# Patient Record
Sex: FEMALE | Race: WHITE | NOT HISPANIC OR LATINO | Employment: FULL TIME | URBAN - METROPOLITAN AREA
[De-identification: names, ages, dates, MRNs, and addresses within clinical notes are randomized per-mention and may not be internally consistent; named-entity substitution may affect disease eponyms.]

---

## 2017-08-05 ENCOUNTER — TRANSFERRED RECORDS (OUTPATIENT)
Dept: HEALTH INFORMATION MANAGEMENT | Facility: CLINIC | Age: 32
End: 2017-08-05

## 2017-08-06 ENCOUNTER — TRANSFERRED RECORDS (OUTPATIENT)
Dept: HEALTH INFORMATION MANAGEMENT | Facility: CLINIC | Age: 32
End: 2017-08-06

## 2017-08-07 ENCOUNTER — TRANSFERRED RECORDS (OUTPATIENT)
Dept: HEALTH INFORMATION MANAGEMENT | Facility: CLINIC | Age: 32
End: 2017-08-07

## 2017-08-09 ENCOUNTER — TRANSFERRED RECORDS (OUTPATIENT)
Dept: HEALTH INFORMATION MANAGEMENT | Facility: CLINIC | Age: 32
End: 2017-08-09

## 2017-08-11 ENCOUNTER — TRANSFERRED RECORDS (OUTPATIENT)
Dept: HEALTH INFORMATION MANAGEMENT | Facility: CLINIC | Age: 32
End: 2017-08-11

## 2017-08-29 ENCOUNTER — TRANSFERRED RECORDS (OUTPATIENT)
Dept: HEALTH INFORMATION MANAGEMENT | Facility: CLINIC | Age: 32
End: 2017-08-29

## 2017-08-30 DIAGNOSIS — H53.10 SUBJECTIVE VISUAL DISTURBANCE: Primary | ICD-10-CM

## 2017-08-31 ENCOUNTER — OFFICE VISIT (OUTPATIENT)
Dept: OPHTHALMOLOGY | Facility: CLINIC | Age: 32
End: 2017-08-31
Attending: OPHTHALMOLOGY
Payer: COMMERCIAL

## 2017-08-31 DIAGNOSIS — H53.10 SUBJECTIVE VISUAL DISTURBANCE: ICD-10-CM

## 2017-08-31 DIAGNOSIS — G93.2 IIH (IDIOPATHIC INTRACRANIAL HYPERTENSION): Primary | ICD-10-CM

## 2017-08-31 PROCEDURE — 92133 CPTRZD OPH DX IMG PST SGM ON: CPT | Mod: ZF | Performed by: OPHTHALMOLOGY

## 2017-08-31 PROCEDURE — 99214 OFFICE O/P EST MOD 30 MIN: CPT | Mod: 25,ZF

## 2017-08-31 PROCEDURE — 92083 EXTENDED VISUAL FIELD XM: CPT | Mod: ZF | Performed by: OPHTHALMOLOGY

## 2017-08-31 RX ORDER — NICOTINE 21 MG/24HR
1 PATCH, TRANSDERMAL 24 HOURS TRANSDERMAL DAILY
COMMUNITY
Start: 2017-08-11 | End: 2018-07-24

## 2017-08-31 RX ORDER — NAPROXEN 500 MG/1
500 TABLET ORAL PRN
COMMUNITY
Start: 2017-08-07 | End: 2018-07-24

## 2017-08-31 RX ORDER — ACETAMINOPHEN 500 MG
1 TABLET ORAL PRN
COMMUNITY
End: 2018-11-27

## 2017-08-31 RX ORDER — HYDROCHLOROTHIAZIDE 25 MG/1
12.5 TABLET ORAL DAILY
COMMUNITY
Start: 2017-08-11 | End: 2018-07-24

## 2017-08-31 ASSESSMENT — REFRACTION_WEARINGRX
OD_SPHERE: -1.50
OS_CYLINDER: +0.50
OS_AXIS: 165
OD_AXIS: 178
OD_CYLINDER: +0.50
OS_SPHERE: -1.50

## 2017-08-31 ASSESSMENT — VISUAL ACUITY
OS_CC+: -1
OD_CC: J1+
OS_CC: 20/20
METHOD: SNELLEN - LINEAR
OD_CC: 20/20
CORRECTION_TYPE: GLASSES
OS_CC: J1+

## 2017-08-31 ASSESSMENT — EXTERNAL EXAM - RIGHT EYE: OD_EXAM: NORMAL

## 2017-08-31 ASSESSMENT — CUP TO DISC RATIO
OD_RATIO: 0.2
OS_RATIO: 0.2

## 2017-08-31 ASSESSMENT — TONOMETRY
OS_IOP_MMHG: 12
OD_IOP_MMHG: 12
IOP_METHOD: ICARE

## 2017-08-31 ASSESSMENT — EXTERNAL EXAM - LEFT EYE: OS_EXAM: NORMAL

## 2017-08-31 ASSESSMENT — SLIT LAMP EXAM - LIDS
COMMENTS: NORMAL
COMMENTS: NORMAL

## 2017-08-31 ASSESSMENT — CONF VISUAL FIELD
OS_NORMAL: 1
OD_NORMAL: 1

## 2017-08-31 NOTE — NURSING NOTE
Chief Complaints and History of Present Illnesses   Patient presents with     New Patient     vision changes     HPI    Affected eye(s):  Both   Symptoms:     Blurred vision   No floaters   No flashes   No Dryness   No photophobia         Do you have eye pain now?:  No      Comments:  New patient is here for vision changes.    The patient noticed vision changes and headaches two months ago.  ANDI Burgos 7:21 AM 08/31/2017

## 2017-08-31 NOTE — MR AVS SNAPSHOT
After Visit Summary   8/31/2017    Lana Kumar    MRN: [id number]           Patient Information     Date Of Birth          1985        Visit Information        Provider Department      8/31/2017 7:15 AM Eder Lea MD [facility]        Today's Diagnoses     IIH (idiopathic intracranial hypertension)    -  1    Subjective visual disturbance           Follow-ups after your visit        Follow-up notes from your care team     Return in about 5 weeks (around 10/5/2017) for Vision, color, tension, dilate, RNFL, GTOP.      Your next 10 appointments already scheduled     Oct 05, 2017 10:00 AM CDT   VISUAL FIELD with [facility] EYE VISUAL FIELD   Eye Clinic ([facility])    [address]  [address]   [facility] [address] [place] [id number] [phone]            Oct 05, 2017 10:30 AM CDT   RETURN NEURO with Eder Lea MD   Eye Clinic ([facility])    [address]  [address] [facility]  [facility] [address] [place] [id number] [phone]              Who to contact     Please call your clinic at [phone] to:    Ask questions about your health    Make or cancel appointments    Discuss your medicines    Learn about your test results    Speak to your doctor   If you have compliments or concerns about an experience at your clinic, or if you wish to file a complaint, please contact [facility] Patient Relations at [phone] or email us at [email or web address]         Additional Information About Your Visit        MyChart Information     whodoyou is an electronic gateway that provides easy, online access to your medical records. With whodoyou, you can request a clinic appointment, read your test results, renew a prescription or communicate with your care team.     To sign up for Video Passportst visit the website at [email or web address]   You will be asked to enter the access code listed below, as well as  some personal information. Please follow the directions to create your username and password.     Your access code is: [email or web address]  Expires: 2017  6:30 AM     Your access code will  in 90 days. If you need help or a new code, please contact your [facility] or call [phone] for assistance.        Care EveryWhere ID     This is your Care EveryWhere ID. This could be used by other organizations to access your [facility] medical records  [id number]         Blood Pressure from Last 3 Encounters:   No data found for BP    Weight from Last 3 Encounters:   No data found for Wt              We Performed the Following     Glaucoma Top OU     IOP Measurement     OCT Optic Nerve RNFL Spectralis OU (both eyes)        Primary Care Provider    None Specified       No primary provider on file.        Equal Access to Services     KRISTI LIMA : Immanuel Colby, waaxda luqadaha, qaybta kaalmada yadiel, jeremías merino . So [facility] [phone].    ATENCIÓN: Si habla español, tiene a james disposición servicios gratuitos de asistencia lingüística. Llame al [phone].    We comply with applicable federal civil rights laws and [place] laws. We do not discriminate on the basis of race, color, national origin, age, disability sex, sexual orientation or gender identity.            Thank you!     Thank you for choosing EYE CLINIC  for your care. Our goal is always to provide you with excellent care. Hearing back from our patients is one way we can continue to improve our services. Please take a few minutes to complete the written survey that you may receive in the mail after your visit with us. Thank you!             Your Updated Medication List - Protect others around you: Learn how to safely use, store and throw away your medicines at www.disposemymeds.org.          This list is accurate as of: 17  9:10 AM.  Always use your most recent med list.                    Brand Name Dispense Instructions for use Diagnosis    acetaminophen 500 MG tablet    TYLENOL     Take 1 tablet by mouth as needed        hydrochlorothiazide 25 MG tablet    HYDRODIURIL     Take 12.5 mg by mouth daily        naproxen 500 MG tablet    NAPROSYN     Take 500 mg by mouth as needed        nicotine 14 MG/24HR 24 hr patch    NICODERM CQ     1 patch daily        tiZANidine 4 MG tablet    ZANAFLEX     Take 4 mg by mouth daily

## 2017-08-31 NOTE — PROGRESS NOTES
Assessment & Plan            Assessment & Plan     Lana Kumar is a 32 year old female with the following diagnoses:   1. IIH (idiopathic intracranial hypertension)    2. Subjective visual disturbance       She is here referred by Dr. Desire Son O.D. for 2-month history of headache. Pressure-like starting in the forehead moving across the temples to the back. No transient visual obscurations, no pulsatile tinnitus or diplopia. She has been seen in ED in [facility] for severe headache on 8/5/17 where she had a spinal tap showing opening pressure of 36 cm H20 while laying flat and it was very painful to her. LP results were : Protein 26, Glucose 52, Cell count 4.   She also had MRI/MRV of the brain showing only stenosis at the junction between sigmoid and transverse sinus on both sides. She returned to the ED the next day for headache recurrence and she was given a blood patch. She has seen on 8/8 Dr. Reynolds at [facility] who was skeptical about the diagnosis of IIH and he thus repeated LP on 8/9 with Vallium prior and opening pressure was found to be 10.5. Dr. Reynolds believed that her headache is cervicogenic and recommended Tizanidine which she has been taking and seems to be helping and seeing a Chiropractor as well.    Her examination shows Frisen grade II disc edema OU. Visual fields were unreliable in the right eye and showed nasal step in the left eye.  However, the ganglion cell layer  Was normal both eyes.    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     This constellation of findings in the setting of normal CSF constituents is consistent with idiopathic intracranial hypertension. I would imagine that the lumbar puncture opening pressure the second time was falsely low.      Would recommend Diamox 500 mg BID and  follow up in 6 weeks sooner as needed for worsening symptoms.  I do not think her visual fields are accurate given the normal ganglion cell layer.  She states that she is very tired and did not do well on the testing.        Attending Physician Attestation:  Complete documentation of historical and exam elements from today's encounter can be found in the full encounter summary report (not reduplicated in this progress note).  I personally obtained the chief complaint(s) and history of present illness.  I confirmed and edited as necessary the review of systems, past medical/surgical history, family history, social history, and examination findings as documented by others; and I examined the patient myself.  I personally reviewed the relevant tests, images, and reports as documented above.  I formulated and edited as necessary the assessment and plan and discussed the findings and management plan with the patient and family. - Eder Lea MD

## 2017-08-31 NOTE — LETTER
2017         RE:  :  MRN: Lana Kumar  1985  [id number]     Dear Dr. Son,    Thank you for asking me to see your very pleasant patient, Lana Kumar, in neuro-ophthalmic consultation.  I would like to thank you for sending your records and I have summarized them in the history of present illness.   My assessment and plan are below.  For further details, please see my attached clinic note.      Assessment & Plan     Lana Kumar is a 32 year old female with the following diagnoses:   1. IIH (idiopathic intracranial hypertension)    2. Subjective visual disturbance       She is here referred by Dr. Desire Son O.D. for 2-month history of headache. Pressure-like starting in the forehead moving across the temples to the back. No transient visual obscurations, no pulsatile tinnitus or diplopia. She has been seen in ED in [facility] for severe headache on 17 where she had a spinal tap showing opening pressure of 36 cm H20 while laying flat and it was very painful to her. LP results were : Protein 26, Glucose 52, Cell count 4.   She also had MRI/MRV of the brain showing only stenosis at the junction between sigmoid and transverse sinus on both sides. She returned to the ED the next day for headache recurrence and she was given a blood patch. She has seen on  Dr. Reynolds at [facility] who was skeptical about the diagnosis of IIH and he thus repeated LP on  with Vallium prior and opening pressure was found to be 10.5. Dr. Reynolds believed that her headache is cervicogenic and recommended Tizanidine which she has been taking and seems to be helping and seeing a Chiropractor as well.    Her examination shows Frisen grade II disc edema OU. Visual fields were unreliable in the right eye and showed nasal step in the left eye.  However, the ganglion cell layer  Was normal both eyes.    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     This constellation of findings in the setting of normal CSF constituents is consistent with idiopathic intracranial hypertension. I would imagine that the lumbar puncture opening pressure the second time was falsely low.      Would recommend Diamox 500 mg BID and follow up in 6 weeks sooner as needed for worsening symptoms.  I do not think her visual fields are accurate given the normal ganglion cell layer.  She states that she is very tired and did not do well on the testing.       Again, thank you for allowing me to participate in the care of your patient.      Sincerely,    Eder Lea MD  Professor, Neuro-Ophthalmology  Department of Ophthalmology and Visual Neurosciences  [facility]    CC: Miriam Son OD  [address] [address]  VIA Facsimile: [phone]       DX = Idiopathic Intracranial Hypertension (IIH)

## 2017-10-04 DIAGNOSIS — H53.10 SUBJECTIVE VISUAL DISTURBANCE: Primary | ICD-10-CM

## 2018-07-24 ENCOUNTER — OFFICE VISIT (OUTPATIENT)
Dept: OPHTHALMOLOGY | Facility: CLINIC | Age: 33
End: 2018-07-24
Attending: OPHTHALMOLOGY
Payer: MEDICAID

## 2018-07-24 DIAGNOSIS — G93.2 IDIOPATHIC INTRACRANIAL HYPERTENSION: ICD-10-CM

## 2018-07-24 DIAGNOSIS — H47.10 OPTIC DISC EDEMA: Primary | ICD-10-CM

## 2018-07-24 DIAGNOSIS — H53.10 SUBJECTIVE VISUAL DISTURBANCE: ICD-10-CM

## 2018-07-24 PROCEDURE — 92133 CPTRZD OPH DX IMG PST SGM ON: CPT | Mod: ZF | Performed by: OPHTHALMOLOGY

## 2018-07-24 PROCEDURE — G0463 HOSPITAL OUTPT CLINIC VISIT: HCPCS | Mod: ZF | Performed by: TECHNICIAN/TECHNOLOGIST

## 2018-07-24 PROCEDURE — 92083 EXTENDED VISUAL FIELD XM: CPT | Mod: ZF | Performed by: OPHTHALMOLOGY

## 2018-07-24 RX ORDER — INDOMETHACIN 50 MG/1
50 CAPSULE ORAL 2 TIMES DAILY WITH MEALS
COMMUNITY
End: 2018-11-27

## 2018-07-24 RX ORDER — ARIPIPRAZOLE 5 MG/1
5 TABLET ORAL DAILY
COMMUNITY

## 2018-07-24 ASSESSMENT — REFRACTION_WEARINGRX
OS_SPHERE: -1.50
OS_CYLINDER: +0.50
OD_CYLINDER: +0.50
OD_SPHERE: -1.50
OD_AXIS: 178
OS_AXIS: 165

## 2018-07-24 ASSESSMENT — TONOMETRY
IOP_METHOD: ICARE
OS_IOP_MMHG: 15
OD_IOP_MMHG: 14

## 2018-07-24 ASSESSMENT — VISUAL ACUITY
OS_CC: 20/20
OD_CC: 20/20
METHOD: SNELLEN - LINEAR
CORRECTION_TYPE: GLASSES

## 2018-07-24 ASSESSMENT — CONF VISUAL FIELD
METHOD: COUNTING FINGERS
OS_NORMAL: 1
OD_NORMAL: 1

## 2018-07-24 ASSESSMENT — EXTERNAL EXAM - RIGHT EYE: OD_EXAM: NORMAL

## 2018-07-24 ASSESSMENT — CUP TO DISC RATIO
OD_RATIO: 0.2
OS_RATIO: 0.25

## 2018-07-24 ASSESSMENT — SLIT LAMP EXAM - LIDS
COMMENTS: NORMAL
COMMENTS: NORMAL

## 2018-07-24 ASSESSMENT — EXTERNAL EXAM - LEFT EYE: OS_EXAM: NORMAL

## 2018-07-24 NOTE — PROGRESS NOTES
"       Assessment & Plan     Lana Kumar is a 32 year old female with the following diagnoses:   1. Idiopathic intracranial hypertension    2. Subjective visual disturbance       32YO F here for f/u if Idiopathic Intracranial Hypertension (IIH). Currently taking 500mg twice a day. Due to to insurance issues, she was not able to take diamox from November of 2017 to July of 20018. She just started about 5 days ago.     She presented to the ED on 7/19/18. She \"feels very dizzy\" and when she looks at the floor, \"it looks like a heart beat\". She felt like her eyeballs were going to pop out.  Lumbar puncture (prone position) under fluorscopically guidance was 37cm H20. CSF findings normal. 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     Her vision has gotten worse in both eyes in months. She has headaches - daily constant headache. Not worse though. No double vision but sometimes she has difficult focusing. She describes pulsatile tinnitus (started 6 months ago). + TVOs. + confusion + neck/back pain, constantly feeling tired.      She has lost 20lbs over the past year.     Visual acuity is 20/20 in both eyes.  Pupils react briskly to light without afferent pupillary defect.  Color vision and visual fields to confrontation are full.  Intraocular pressure is normal.  Fundoscopic exam showed optic disc edema both eyes.  Her visual field looks better and her retinal nerve fiber layer looks better both eyes.      It is my impression that she has Idiopathic Intracranial Hypertension (IIH).  Overall, her visual field and optic disc edema are improved.  Would continue present management on diamox 500 twice a day and follow up in 4 months sooner as needed for worsening symptoms.  Encourage weight loss.           Attending Physician Attestation:  " Complete documentation of historical and exam elements from today's encounter can be found in the full encounter summary report (not reduplicated in this progress note).  I personally obtained the chief complaint(s) and history of present illness.  I confirmed and edited as necessary the review of systems, past medical/surgical history, family history, social history, and examination findings as documented by others; and I examined the patient myself.  I personally reviewed the relevant tests, images, and reports as documented above.  I formulated and edited as necessary the assessment and plan and discussed the findings and management plan with the patient and family. - Eder Lea MD

## 2018-07-24 NOTE — LETTER
"7/24/2018       RE: Lana Kumar  [address] [place] [address]     Dear Dr Son,    Your patient, Lana Kumar, returned for neuro-ophthalmic follow up. My assessment and plan are below.  For further details, please see my attached clinic note.      Assessment & Plan     Lana Kumar is a 32 year old female with the following diagnoses:   1. Idiopathic intracranial hypertension    2. Subjective visual disturbance       32YO F here for f/u if Idiopathic Intracranial Hypertension (IIH). Currently taking 500mg twice a day. Due to to insurance issues, she was not able to take diamox from November of 2017 to July of 20018. She just started about 5 days ago.     She presented to the ED on 7/19/18. She \"feels very dizzy\" and when she looks at the floor, \"it looks like a heart beat\". She felt like her eyeballs were going to pop out.  Lumbar puncture (prone position) under fluorscopically guidance was 37cm H20. CSF findings normal. 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     Her vision has gotten worse in both eyes in months. She has headaches - daily constant headache. Not worse though. No double vision but sometimes she has difficult focusing. She describes pulsatile tinnitus (started 6 months ago). + TVOs. + confusion + neck/back pain, constantly feeling tired.      She has lost 20lbs over the past year.     Visual acuity is 20/20 in both eyes.  Pupils react briskly to light without afferent pupillary defect.  Color vision and visual fields to confrontation are full.  Intraocular pressure is normal.  Fundoscopic exam showed optic disc edema both eyes.  Her visual field looks better and her retinal nerve fiber layer looks better both eyes.      It is my impression that she has Idiopathic Intracranial Hypertension (IIH).  " Overall, her visual field and optic disc edema are improved.  Would continue present management on diamox 500 twice a day and follow up in 4 months sooner as needed for worsening symptoms.  Encourage weight loss.      Attending Physician Attestation:  Complete documentation of historical and exam elements from today's encounter can be found in the full encounter summary report (not reduplicated in this progress note).  I personally obtained the chief complaint(s) and history of present illness.  I confirmed and edited as necessary the review of systems, past medical/surgical history, family history, social history, and examination findings as documented by others; and I examined the patient myself.  I personally reviewed the relevant tests, images, and reports as documented above.  I formulated and edited as necessary the assessment and plan and discussed the findings and management plan with the patient and family. - Eder Lea MD      Again, thank you for allowing me to participate in the care of your patient.      Sincerely,    Eder Lea MD  Professor, Neuro-Ophthalmology  Department of Ophthalmology and Visual Neurosciences  [facility]

## 2018-07-24 NOTE — MR AVS SNAPSHOT
After Visit Summary   7/24/2018    Lana Kumar    MRN: [id number]           Patient Information     Date Of Birth          1985        Visit Information        Provider Department      7/24/2018 2:00 PM E[facility] Lea MD [facility]        Today's Diagnoses     Idiopathic intracranial hypertension        Subjective visual disturbance           Follow-ups after your visit        Follow-up notes from your care team     Return in about 4 months (around 11/24/2018) for Vision, color, tension, dilate, RNFL, GTOP.      Your next 10 appointments already scheduled     Nov 27, 2018  9:00 AM CST   RETURN NEURO with Eder Lea MD   Eye Clinic ([facility])    [address]  9[facility]  [facility] [id number] [phone]              Future tests that were ordered for you today     Open Future Orders        Priority Expected Expires Ordered    MR Brain w/o & w Contrast Routine  7/24/2019 7/24/2018    DILATED FUNDUS EXAM Routine  9/22/2018 7/24/2018    OCT Optic Nerve RNFL Spectralis OU (both eyes) Routine  9/22/2018 7/24/2018            Who to contact     Please call your clinic at [phone] to:    Ask questions about your health    Make or cancel appointments    Discuss your medicines    Learn about your test results    Speak to your doctor            Additional Information About Your Visit        MyChart Information     ClrToucht is an electronic gateway that provides easy, online access to your medical records. With SheerID, you can request a clinic appointment, read your test results, renew a prescription or communicate with your care team.     To sign up for ClrToucht visit the website at [email or web address]   You will be asked to enter the access code listed below, as well as some personal information. Please follow the directions to create your username and password.     Your access code is: [email or web address]  Expires: 10/22/2018   6:31 AM     Your access code will  in 90 days. If you need help or a new code, please contact your [facility] or call [phone] for assistance.        Care EveryWhere ID     This is your Care EveryWhere ID. This could be used by other organizations to access your [facility] medical records  [id number]         Blood Pressure from Last 3 Encounters:   No data found for BP    Weight from Last 3 Encounters:   No data found for Wt              We Performed the Following     Glaucoma Top OU     IOP Measurement     OCT Optic Nerve RNFL Spectralis OU (both eyes)          Today's Medication Changes          These changes are accurate as of 18  3:44 PM.  If you have any questions, ask your nurse or doctor.               Stop taking these medicines if you haven't already. Please contact your care team if you have questions.     hydrochlorothiazide 25 MG tablet   Commonly known as:  HYDRODIURIL   Stopped by:  Eder Lea MD           naproxen 500 MG tablet   Commonly known as:  NAPROSYN   Stopped by:  Eder Lea MD           nicotine 14 MG/24HR 24 hr patch   Commonly known as:  NICODERM CQ   Stopped by:  Eder Lea MD           tiZANidine 4 MG tablet   Commonly known as:  ZANAFLEX   Stopped by:  Eder Lea MD                    Primary Care Provider    None Specified       No primary provider on file.        Equal Access to Services     KRISTI LIMA : Hadii hayden soni hadasho Soomaali, waaxda luqadaha, qaybta kaalmada adeegyada, jeremías merino . So [facility] [phone].    ATENCIÓN: Si habla español, tiene a james disposición servicios gratuitos de asistencia lingüística. Llame al [phone].    We comply with applicable federal civil rights laws and [place] laws. We do not discriminate on the basis of race, color, national origin, age, disability, sex, sexual orientation, or gender identity.            Thank  you!     Thank you for choosing EYE CLINIC  for your care. Our goal is always to provide you with excellent care. Hearing back from our patients is one way we can continue to improve our services. Please take a few minutes to complete the written survey that you may receive in the mail after your visit with us. Thank you!             Your Updated Medication List - Protect others around you: Learn how to safely use, store and throw away your medicines at www.disposemymeds.org.          This list is accurate as of 7/24/18  3:44 PM.  Always use your most recent med list.                   Brand Name Dispense Instructions for use Diagnosis    acetaminophen 500 MG tablet    TYLENOL     Take 1 tablet by mouth as needed        ARIPiprazole 5 MG tablet    ABILIFY     Take 5 mg by mouth daily        indomethacin 50 MG capsule    INDOCIN     Take 50 mg by mouth 2 times daily (with meals)

## 2018-07-24 NOTE — NURSING NOTE
"Chief Complaints and History of Present Illnesses   Patient presents with     Follow Up For     IIH     HPI    Symptoms:              Comments:  Patient states vision in both eyes has gotten worse. Blurry vision each eye. Patient states if was to look at the \"E\" at the distance, it looks bigger.   Headaches still present but stable, not getting worse.     Diamox 500 mg BID per patient.     BERTHA Joy 7/24/2018 1:42 PM               "

## 2018-07-25 ENCOUNTER — TRANSFERRED RECORDS (OUTPATIENT)
Dept: HEALTH INFORMATION MANAGEMENT | Facility: CLINIC | Age: 33
End: 2018-07-25

## 2018-07-27 ENCOUNTER — TRANSFERRED RECORDS (OUTPATIENT)
Dept: HEALTH INFORMATION MANAGEMENT | Facility: CLINIC | Age: 33
End: 2018-07-27

## 2018-08-15 ENCOUNTER — TRANSFERRED RECORDS (OUTPATIENT)
Dept: HEALTH INFORMATION MANAGEMENT | Facility: CLINIC | Age: 33
End: 2018-08-15

## 2018-11-27 ENCOUNTER — OFFICE VISIT (OUTPATIENT)
Dept: OPHTHALMOLOGY | Facility: CLINIC | Age: 33
End: 2018-11-27
Attending: OPHTHALMOLOGY
Payer: COMMERCIAL

## 2018-11-27 DIAGNOSIS — H47.393 OTHER DISORDERS OF OPTIC DISC, BILATERAL: Primary | ICD-10-CM

## 2018-11-27 DIAGNOSIS — G93.2 IDIOPATHIC INTRACRANIAL HYPERTENSION: ICD-10-CM

## 2018-11-27 DIAGNOSIS — G93.2 IDIOPATHIC INTRACRANIAL HYPERTENSION: Primary | ICD-10-CM

## 2018-11-27 DIAGNOSIS — H47.10 OPTIC DISC EDEMA: ICD-10-CM

## 2018-11-27 PROCEDURE — G0463 HOSPITAL OUTPT CLINIC VISIT: HCPCS | Mod: ZF

## 2018-11-27 PROCEDURE — 92083 EXTENDED VISUAL FIELD XM: CPT | Mod: ZF | Performed by: OPHTHALMOLOGY

## 2018-11-27 PROCEDURE — 92133 CPTRZD OPH DX IMG PST SGM ON: CPT | Mod: ZF | Performed by: OPHTHALMOLOGY

## 2018-11-27 RX ORDER — ACETAZOLAMIDE 500 MG/1
CAPSULE, EXTENDED RELEASE ORAL
Refills: 5 | COMMUNITY
Start: 2018-09-17 | End: 2018-11-27

## 2018-11-27 RX ORDER — ARIPIPRAZOLE 10 MG/1
TABLET ORAL
Refills: 1 | COMMUNITY
Start: 2018-10-11

## 2018-11-27 RX ORDER — ACETAZOLAMIDE 250 MG/1
750 TABLET ORAL DAILY
Qty: 90 TABLET | Refills: 3 | Status: SHIPPED | OUTPATIENT
Start: 2018-11-27 | End: 2018-11-27

## 2018-11-27 RX ORDER — ALBUTEROL SULFATE 90 UG/1
AEROSOL, METERED RESPIRATORY (INHALATION)
Refills: 0 | COMMUNITY
Start: 2018-08-22

## 2018-11-27 RX ORDER — AMLODIPINE BESYLATE 5 MG/1
TABLET ORAL
Refills: 3 | COMMUNITY
Start: 2018-10-11

## 2018-11-27 RX ORDER — ACETAZOLAMIDE 250 MG/1
750 TABLET ORAL DAILY
Qty: 90 TABLET | Refills: 3 | Status: SHIPPED | OUTPATIENT
Start: 2018-11-27

## 2018-11-27 RX ORDER — AZITHROMYCIN 250 MG/1
TABLET, FILM COATED ORAL
Refills: 0 | COMMUNITY
Start: 2018-09-13

## 2018-11-27 ASSESSMENT — SLIT LAMP EXAM - LIDS
COMMENTS: NORMAL
COMMENTS: NORMAL

## 2018-11-27 ASSESSMENT — VISUAL ACUITY
METHOD: SNELLEN - LINEAR
OD_CC: 20/20
CORRECTION_TYPE: GLASSES
OS_CC: 20/20

## 2018-11-27 ASSESSMENT — REFRACTION_WEARINGRX
OS_SPHERE: -1.50
OD_SPHERE: -1.50
OD_AXIS: 178
OD_CYLINDER: +0.50
OS_CYLINDER: +0.50
OS_AXIS: 165

## 2018-11-27 ASSESSMENT — TONOMETRY
IOP_METHOD: ICARE
OD_IOP_MMHG: 22
OS_IOP_MMHG: 22

## 2018-11-27 ASSESSMENT — CONF VISUAL FIELD
OD_NORMAL: 1
OS_NORMAL: 1

## 2018-11-27 ASSESSMENT — CUP TO DISC RATIO
OS_RATIO: 0.35
OD_RATIO: 0.35

## 2018-11-27 ASSESSMENT — EXTERNAL EXAM - LEFT EYE: OS_EXAM: NORMAL

## 2018-11-27 ASSESSMENT — EXTERNAL EXAM - RIGHT EYE: OD_EXAM: NORMAL

## 2018-11-27 NOTE — NURSING NOTE
Chief Complaints and History of Present Illnesses   Patient presents with     Neurologic Problem     IIH F/U     HPI    Symptoms:              Comments:  Lana Kumar is a 33 year old female who presents today for  1. Idiopathic intracranial hypertension   2. Subjective visual disturbance     Currently taking 500 mg diamox bid. Tolerating side effects well  No change in vision since the last visit.   No headaches    Zeynep STONE 9:08 AM November 27, 2018

## 2018-11-27 NOTE — LETTER
2018         RE:  :  MRN: Lana Kumar  1985  [id number]     Dear Dr. Son:     Your patient, Lana Kumar, returned for neuro-ophthalmic follow up. My assessment and plan are below.  For further details, please see my attached clinic note.          Assessment & Plan     Lana Kumar is a 33 year old female with the following diagnoses:   1. Idiopathic intracranial hypertension         Patient is here for 4 month follow up of IIH.  Last visit was 2018 and at that time noted to have continued optic disc edema both eyes.  Continues to have visual hallucinations, but have improved since start.  Has gained weight since smoking cessation.  Continues taking Diamox 500 mg twice a day.        Visual acuity today 20/20 both eyes.  No optic disc edema today.  Her visual field is stable and her optical coherence tomography is much improved.      It is my impression that patient's Idiopathic Intracranial Hypertension (IIH) is much improved with resolved optic disc edema both eyes today.  Decrease diamox to 750 mg for a 1 month, 500 mg for a month, 250 mg for a month and then stop.         Again, thank you for allowing me to participate in the care of your patient.      Sincerely,    Eder Lea MD  Professor, Neuro-Ophthalmology  Department of Ophthalmology and Visual Neurosciences  [facility]      CC: Miriam Son OD  [facility]  [address] [address]  VIA Facsimile: [phone]     Eder Reynolds MD  [facility]  [address] [address]  VIA Facsimile: [phone]

## 2018-11-27 NOTE — MR AVS SNAPSHOT
After Visit Summary   11/27/2018    Lana Kumar    MRN: [id number]           Patient Information     Date Of Birth          1985        Visit Information        Provider Department      11/27/2018 9:00 AM Eder Lea MD [facility]        Today's Diagnoses     Idiopathic intracranial hypertension           Follow-ups after your visit        Your next 10 appointments already scheduled     Mar 28, 2019  9:00 AM CDT   RETURN NEURO with Eder Lea MD   [facility] ([facility])    [address]  [address] [address]  [facility] [address]   [phone]              Who to contact     Please call your clinic at [phone] to:    Ask questions about your health    Make or cancel appointments    Discuss your medicines    Learn about your test results    Speak to your doctor            Additional Information About Your Visit        MyChart Information     [employer] gives you secure access to your electronic health record. If you see a primary care provider, you can also send messages to your care team and make appointments. If you have questions, please call your primary care clinic.  If you do not have a primary care provider, please call [phone] and they will assist you.      [employer] is an electronic gateway that provides easy, online access to your medical records. With [employer], you can request a clinic appointment, read your test results, renew a prescription or communicate with your care team.     To access your existing account, please contact your [facility] or call [phone] for assistance.        Care EveryWhere ID     This is your Care EveryWhere ID. This could be used by other organizations to access your [place] medical records  [id number]         Blood Pressure from Last 3 Encounters:   No data found for BP    Weight from Last 3 Encounters:   No data found for Wt              We Performed  the Following     DILATED FUNDUS EXAM     Glaucoma Top OU     IOP Measurement     OCT Optic Nerve RNFL Spectralis OU (both eyes)          Today's Medication Changes          These changes are accurate as of 11/27/18 10:37 AM.  If you have any questions, ask your nurse or doctor.               Start taking these medicines.        Dose/Directions    acetaZOLAMIDE 250 MG tablet   Commonly known as:  DIAMOX   Used for:  Idiopathic intracranial hypertension   Replaces:  acetaZOLAMIDE 500 MG 12 hr capsule   Started by:  Eder Lea MD        Dose:  750 mg   Take 3 tablets (750 mg) by mouth daily 2 tabs QAM and 1 tab QPM for 1 month, then 1 tab BID for month, then 1 tab QD for month and stop   Quantity:  90 tablet   Refills:  3         Stop taking these medicines if you haven't already. Please contact your care team if you have questions.     acetaminophen 500 MG tablet   Commonly known as:  TYLENOL   Stopped by:  Eder Lea MD           acetaZOLAMIDE 500 MG 12 hr capsule   Commonly known as:  DIAMOX SEQUEL   Replaced by:  acetaZOLAMIDE 250 MG tablet   Stopped by:  Eder Lea MD           indomethacin 50 MG capsule   Commonly known as:  INDOCIN   Stopped by:  Eder Lea MD                Where to get your medicines      These medications were sent to [id number] - [place], [place] - [address]  [address] [facility] [address]  [facility] [email or web address], [place] [address]     Phone:  [phone]     acetaZOLAMIDE 250 MG tablet                Primary Care Provider    None Specified       No primary provider on file.        Equal Access to Services     [facility] Center[facility]IMILIAN AH: Hadii hayden Colby, waaxda luqadaha, qaybta kaalmada dayanegyada, jeremías parks adenemo pinzon. So [facility] [phone].    ATENCIÓN: Si habla español, tiene a james disposición servicios gratuitos de asistencia lingüística. Llame al [phone].    We comply with  applicable federal civil rights laws and [place] laws. We do not discriminate on the basis of race, color, national origin, age, disability, sex, sexual orientation, or gender identity.            Thank you!     Thank you for choosing EYE CL[facility]IC  for your care. Our goal is always to provide you with excellent care. Hearing back from our patients is one way we can continue to improve our services. Please take a few minutes to complete the written survey that you may receive in the mail after your visit with us. Thank you!             Your Updated Medication List - Protect others around you: Learn how to safely use, store and throw away your medicines at www.disposemymeds.org.          This list is accurate as of 11/27/18 10:37 AM.  Always use your most recent med list.                   Brand Name Dispense Instructions for use Diagnosis    acetaZOLAMIDE 250 MG tablet    DIAMOX    90 tablet    Take 3 tablets (750 mg) by mouth daily 2 tabs QAM and 1 tab QPM for 1 month, then 1 tab BID for month, then 1 tab QD for month and stop    Idiopathic intracranial hypertension       amLODIPine 5 MG tablet    NORVASC     TK 1 T PO D        * ARIPiprazole 5 MG tablet    ABILIFY     Take 5 mg by mouth daily        * ARIPiprazole 10 MG tablet    ABILIFY     TK 1 T PO QD        azithromycin 250 MG tablet    ZITHROMAX          VENTOLIN  (90 Base) MCG/ACT inhaler   Generic drug:  albuterol      INHALE 2 PUFFS PO Q 4 H PRN        * Notice:  This list has 2 medication(s) that are the same as other medications prescribed for you. Read the directions carefully, and ask your doctor or other care provider to review them with you.

## 2018-11-27 NOTE — PROGRESS NOTES
Assessment & Plan     Lana Kumar is a 33 year old female with the following diagnoses:   1. Idiopathic intracranial hypertension         Patient is here for 4 month follow up of IIH.  Last visit was July 2018 and at that time noted to have continued optic disc edema both eyes.  Continues to have visual hallucinations, but have improved since start.  Has gained weight since smoking cessation.  Continues taking Diamox 500 mg twice a day.        Visual acuity today 20/20 both eyes.  No optic disc edema today.  Her visual field is stable and her optical coherence tomography is much improved.      It is my impression that patient's Idiopathic Intracranial Hypertension (IIH) is much improved with resolved optic disc edema both eyes today.  Decrease diamox to 750 mg for a 1 month, 500 mg for a month, 250 mg for a month and then stop.            Attending Physician Attestation:  Complete documentation of historical and exam elements from today's encounter can be found in the full encounter summary report (not reduplicated in this progress note).  I personally obtained the chief complaint(s) and history of present illness.  I confirmed and edited as necessary the review of systems, past medical/surgical history, family history, social history, and examination findings as documented by others; and I examined the patient myself.  I personally reviewed the relevant tests, images, and reports as documented above.  I formulated and edited as necessary the assessment and plan and discussed the findings and management plan with the patient and family. - Eder Lea MD

## 2018-12-02 ENCOUNTER — HEALTH MAINTENANCE LETTER (OUTPATIENT)
Age: 33
End: 2018-12-02

## 2019-03-28 DIAGNOSIS — G93.2 IDIOPATHIC INTRACRANIAL HYPERTENSION: Primary | ICD-10-CM

## 2019-11-03 ENCOUNTER — HEALTH MAINTENANCE LETTER (OUTPATIENT)
Age: 34
End: 2019-11-03

## 2020-11-16 ENCOUNTER — HEALTH MAINTENANCE LETTER (OUTPATIENT)
Age: 35
End: 2020-11-16

## 2021-06-01 ENCOUNTER — RECORDS - HEALTHEAST (OUTPATIENT)
Dept: ADMINISTRATIVE | Facility: CLINIC | Age: 36
End: 2021-06-01

## 2021-06-02 ENCOUNTER — RECORDS - HEALTHEAST (OUTPATIENT)
Dept: ADMINISTRATIVE | Facility: CLINIC | Age: 36
End: 2021-06-02

## 2021-09-18 ENCOUNTER — HEALTH MAINTENANCE LETTER (OUTPATIENT)
Age: 36
End: 2021-09-18

## 2022-01-08 ENCOUNTER — HEALTH MAINTENANCE LETTER (OUTPATIENT)
Age: 37
End: 2022-01-08

## 2022-11-19 ENCOUNTER — HEALTH MAINTENANCE LETTER (OUTPATIENT)
Age: 37
End: 2022-11-19

## 2023-04-15 ENCOUNTER — HEALTH MAINTENANCE LETTER (OUTPATIENT)
Age: 38
End: 2023-04-15

## 2023-12-21 ENCOUNTER — HOSPITAL ENCOUNTER (EMERGENCY)
Facility: HOSPITAL | Age: 38
Discharge: HOME | End: 2023-12-22
Attending: STUDENT IN AN ORGANIZED HEALTH CARE EDUCATION/TRAINING PROGRAM | Admitting: STUDENT IN AN ORGANIZED HEALTH CARE EDUCATION/TRAINING PROGRAM
Payer: COMMERCIAL

## 2023-12-21 DIAGNOSIS — R05.9 COUGH, UNSPECIFIED TYPE: ICD-10-CM

## 2023-12-21 LAB
BASOPHILS # BLD AUTO: 0 10E3/UL (ref 0–0.2)
BASOPHILS NFR BLD AUTO: 0 %
EOSINOPHIL # BLD AUTO: 0.2 10E3/UL (ref 0–0.7)
EOSINOPHIL NFR BLD AUTO: 2 %
ERYTHROCYTE [DISTWIDTH] IN BLOOD BY AUTOMATED COUNT: 15.2 % (ref 10–15)
HCT VFR BLD AUTO: 38.9 % (ref 35–47)
HGB BLD-MCNC: 12.3 G/DL (ref 11.7–15.7)
IMM GRANULOCYTES # BLD: 0 10E3/UL
IMM GRANULOCYTES NFR BLD: 0 %
LYMPHOCYTES # BLD AUTO: 2.2 10E3/UL (ref 0.8–5.3)
LYMPHOCYTES NFR BLD AUTO: 19 %
MCH RBC QN AUTO: 25.5 PG (ref 26.5–33)
MCHC RBC AUTO-ENTMCNC: 31.6 G/DL (ref 31.5–36.5)
MCV RBC AUTO: 81 FL (ref 78–100)
MONOCYTES # BLD AUTO: 0.8 10E3/UL (ref 0–1.3)
MONOCYTES NFR BLD AUTO: 7 %
NEUTROPHILS # BLD AUTO: 8.4 10E3/UL (ref 1.6–8.3)
NEUTROPHILS NFR BLD AUTO: 72 %
NRBC # BLD AUTO: 0 10E3/UL
NRBC BLD AUTO-RTO: 0 /100
PLATELET # BLD AUTO: 256 10E3/UL (ref 150–450)
RBC # BLD AUTO: 4.83 10E6/UL (ref 3.8–5.2)
WBC # BLD AUTO: 11.7 10E3/UL (ref 4–11)

## 2023-12-21 PROCEDURE — 87637 SARSCOV2&INF A&B&RSV AMP PRB: CPT | Performed by: STUDENT IN AN ORGANIZED HEALTH CARE EDUCATION/TRAINING PROGRAM

## 2023-12-21 PROCEDURE — 84443 ASSAY THYROID STIM HORMONE: CPT | Performed by: STUDENT IN AN ORGANIZED HEALTH CARE EDUCATION/TRAINING PROGRAM

## 2023-12-21 PROCEDURE — 83880 ASSAY OF NATRIURETIC PEPTIDE: CPT | Performed by: STUDENT IN AN ORGANIZED HEALTH CARE EDUCATION/TRAINING PROGRAM

## 2023-12-21 PROCEDURE — 85025 COMPLETE CBC W/AUTO DIFF WBC: CPT | Performed by: STUDENT IN AN ORGANIZED HEALTH CARE EDUCATION/TRAINING PROGRAM

## 2023-12-21 PROCEDURE — 84703 CHORIONIC GONADOTROPIN ASSAY: CPT | Performed by: STUDENT IN AN ORGANIZED HEALTH CARE EDUCATION/TRAINING PROGRAM

## 2023-12-21 PROCEDURE — 93005 ELECTROCARDIOGRAM TRACING: CPT | Performed by: STUDENT IN AN ORGANIZED HEALTH CARE EDUCATION/TRAINING PROGRAM

## 2023-12-21 PROCEDURE — 80053 COMPREHEN METABOLIC PANEL: CPT | Performed by: STUDENT IN AN ORGANIZED HEALTH CARE EDUCATION/TRAINING PROGRAM

## 2023-12-21 PROCEDURE — 85379 FIBRIN DEGRADATION QUANT: CPT | Performed by: STUDENT IN AN ORGANIZED HEALTH CARE EDUCATION/TRAINING PROGRAM

## 2023-12-21 PROCEDURE — 99285 EMERGENCY DEPT VISIT HI MDM: CPT | Mod: 25

## 2023-12-21 PROCEDURE — 36415 COLL VENOUS BLD VENIPUNCTURE: CPT | Performed by: STUDENT IN AN ORGANIZED HEALTH CARE EDUCATION/TRAINING PROGRAM

## 2023-12-21 PROCEDURE — 84484 ASSAY OF TROPONIN QUANT: CPT | Performed by: STUDENT IN AN ORGANIZED HEALTH CARE EDUCATION/TRAINING PROGRAM

## 2023-12-22 ENCOUNTER — APPOINTMENT (OUTPATIENT)
Dept: RADIOLOGY | Facility: HOSPITAL | Age: 38
End: 2023-12-22
Attending: STUDENT IN AN ORGANIZED HEALTH CARE EDUCATION/TRAINING PROGRAM
Payer: COMMERCIAL

## 2023-12-22 VITALS
DIASTOLIC BLOOD PRESSURE: 89 MMHG | OXYGEN SATURATION: 93 % | HEART RATE: 106 BPM | RESPIRATION RATE: 18 BRPM | SYSTOLIC BLOOD PRESSURE: 173 MMHG | HEIGHT: 72 IN | BODY MASS INDEX: 39.68 KG/M2 | WEIGHT: 293 LBS | TEMPERATURE: 98.3 F

## 2023-12-22 LAB
ALBUMIN SERPL BCG-MCNC: 3.7 G/DL (ref 3.5–5.2)
ALP SERPL-CCNC: 108 U/L (ref 40–150)
ALT SERPL W P-5'-P-CCNC: 27 U/L (ref 0–50)
ANION GAP SERPL CALCULATED.3IONS-SCNC: 13 MMOL/L (ref 7–15)
AST SERPL W P-5'-P-CCNC: 14 U/L (ref 0–45)
ATRIAL RATE - MUSE: 94 BPM
BILIRUB SERPL-MCNC: 0.2 MG/DL
BUN SERPL-MCNC: 12.5 MG/DL (ref 6–20)
CALCIUM SERPL-MCNC: 8.9 MG/DL (ref 8.6–10)
CHLORIDE SERPL-SCNC: 103 MMOL/L (ref 98–107)
CREAT SERPL-MCNC: 0.89 MG/DL (ref 0.51–0.95)
D DIMER PPP FEU-MCNC: 0.29 UG/ML FEU (ref 0–0.5)
DEPRECATED HCO3 PLAS-SCNC: 24 MMOL/L (ref 22–29)
DIASTOLIC BLOOD PRESSURE - MUSE: NORMAL MMHG
EGFRCR SERPLBLD CKD-EPI 2021: 84 ML/MIN/1.73M2
FLUAV RNA SPEC QL NAA+PROBE: NEGATIVE
FLUBV RNA RESP QL NAA+PROBE: NEGATIVE
GLUCOSE SERPL-MCNC: 159 MG/DL (ref 70–99)
HCG SERPL QL: NEGATIVE
INTERPRETATION ECG - MUSE: NORMAL
NT-PROBNP SERPL-MCNC: 37 PG/ML (ref 0–450)
P AXIS - MUSE: 53 DEGREES
POTASSIUM SERPL-SCNC: 3.6 MMOL/L (ref 3.4–5.3)
PR INTERVAL - MUSE: 168 MS
PROT SERPL-MCNC: 7.1 G/DL (ref 6.4–8.3)
QRS DURATION - MUSE: 100 MS
QT - MUSE: 366 MS
QTC - MUSE: 457 MS
R AXIS - MUSE: 48 DEGREES
RSV RNA SPEC NAA+PROBE: NEGATIVE
SARS-COV-2 RNA RESP QL NAA+PROBE: NEGATIVE
SODIUM SERPL-SCNC: 140 MMOL/L (ref 135–145)
SYSTOLIC BLOOD PRESSURE - MUSE: NORMAL MMHG
T AXIS - MUSE: 20 DEGREES
TROPONIN T SERPL HS-MCNC: <6 NG/L
TSH SERPL DL<=0.005 MIU/L-ACNC: 3.26 UIU/ML (ref 0.3–4.2)
VENTRICULAR RATE- MUSE: 94 BPM

## 2023-12-22 PROCEDURE — 71046 X-RAY EXAM CHEST 2 VIEWS: CPT

## 2023-12-22 RX ORDER — DOXYCYCLINE 100 MG/1
100 CAPSULE ORAL 2 TIMES DAILY
Qty: 14 CAPSULE | Refills: 0 | Status: SHIPPED | OUTPATIENT
Start: 2023-12-22 | End: 2023-12-29

## 2023-12-22 RX ORDER — PREDNISONE 20 MG/1
TABLET ORAL
Qty: 10 TABLET | Refills: 0 | Status: SHIPPED | OUTPATIENT
Start: 2023-12-22

## 2023-12-22 ASSESSMENT — ACTIVITIES OF DAILY LIVING (ADL): ADLS_ACUITY_SCORE: 35

## 2023-12-22 NOTE — ED PROVIDER NOTES
NAME: Lana Guidry  AGE: 38 year old female  YOB: 1985  MRN: [id number]  EVALUATION DATE & TIME: No admission date for patient encounter.    PCP: No primary care provider on file.  ED PROVIDER: Kareen Pruitt MD.    Chief Complaint   Patient presents with    Cough    Shortness of Breath    Chest Pain       FINAL IMPRESSION:  1. Cough, unspecified type        MEDICAL DECISION MAKIN:25 PM I met with the patient, obtained history, performed an initial exam, and discussed options and plan for diagnostics and treatment here in the ED.   1:05 AM I updated the patient with lab and imaging results and discussed the plan for discharge.       MDM: 39 y/o F who presents with cough and shortness of breath. Has been sick for several weeks with cough and intermittent fevers. More recently tested positive for covid at home earlier this month and was started on paxlovid and steroids 23. Now with worsening cough again. Covid/influeza/RSV are negative. Does have some chest tightness/pain.     Dx includes but not limited to ACS, PE (unlikely negative d-dimer, well's low risk), aortic dissection (unlikely not sudden tearing pain, normal d-dimer, no widened mediastinum on imaging), pneumonia, reactive airway disease,, pericarditis (unlikely given nature of pain not pleuritic or positional and EKG without findings of this), boerhaave's (unlikely no vomiting or pneumomediastinum on imaging), dyspepsia, chest wall pain, intraabdominal pathology (unlikely reassuring abdominal exam), etc.     EKG is sinus rhythm without concerning ST changes and troponin <6 and with constant symptoms for several days do not feel needs delta troponin and overall lower suspicion for ACS. CXR without infiltrate, effusion or pneumothorax. BNP is normal and does not appear volume up, do not suspect CHF. Other labs are generally reassuring, mild leukocytosis noted. No wheezing on my exam though reports having some at home and has  extensive smoking history though no prior formal diagnosis of COPD or asthma. Plan to treat for possible pneumonia and reactive airway disease with albuterol, steroids, antibiotics.     Of note she has had some ongoing fevers/sweats for last several months and her outpatient provider has additional labs and CT chest/abd/pelvis ordered to work this up. No recent travel. I offered to get the CT CAP today to further evaluate but patient declined which I think is reasonable given she has no abdominal pain on exam and her vitals and workup at this point are reassuring.     She feels comfortable with discharge at this time with close outpatient follow up. Strict return precautions discussed and patient is in agreement with plan, endorses understanding and her questions were answered.    Medical Decision Making    History:  Supplemental history from: Family Member/Significant Other  External Record(s) reviewed: Outpatient Record: Office visit on 12/8/23    Work Up:  Chart documentation includes differential considered and any EKGs or imaging independently interpreted by provider, where specified.  In additional to work up documented, I considered the following work up: Documented in chart, if applicable.    External consultation:  Discussion of management with another provider: Documented in chart, if applicable    Complicating factors:  Care impacted by chronic illness: Diabetes, Mental Health, and Smoking / Nicotine Use  Care affected by social determinants of health: N/A    Disposition considerations: Discharge. I prescribed additional prescription strength medication(s) as charted. See documentation for any additional details.      MEDICATIONS GIVEN IN THE EMERGENCY:  Medications - No data to display    NEW PRESCRIPTIONS STARTED AT TODAY'S ER VISIT:  Discharge Medication List as of 12/22/2023  1:20 AM        START taking these medications    Details   doxycycline hyclate (VIBRAMYCIN) 100 MG capsule Take 1 capsule (100  "mg) by mouth 2 times daily for 7 days, Disp-14 capsule, R-0, Local Print      predniSONE (DELTASONE) 20 MG tablet Take two tablets (= 40mg) each day for 5 (five) days, Disp-10 tablet, R-0, Local Print              =================================================================  HPI    Patient information was obtained from: Patient     Use of : N/A       Lana Guidry is a 38 year old female with a past medical history of tobacco use, HTN, DM type II, PCOS, obesity, and bipolar disorder, who presents with multiple complaints    Per chart review:   Patient seen 10/6/23 in clinic for 5 days of cough and wheezing. CXR without acute findings. Prescribed albuterol inhaler, azithromycin, tessalon pearles, omperazole.     The patient was seen in clinic on 12/8/23 for fever and positive COVID-19 test. The patient reports she tested positive for COVID-19 and has had fevers as high as 101 on and off for the past 2-3 months. Patient wonders if the fever could be perimenopause. Plan for patient to get a CT chest abdomen, luteinizing hormone, and FSH tests as an outpatient. Prescribed paxlovid and decadron.     The patient reports 2-3 days of cough and constant mid-sternal chest pain. The patient describes the chest pain as \"sharp\" and \"tight\". The patient notes the chest pain is present even when she is not coughing and has been constant for several days.  The patient notes that she tested positive for COVID-19 at home two weeks ago and finished taking Paxlovid and steroids.   The patient also reports she had some vomiting three days ago that has now resolved. Sister was sick with vomiting at that time as well.  The patient notes that she has had ongoing intermittent sweats and fever for the past 2-3 months.  The patient also notes she has chronic diarrhea at baseline.  The patient denies leg swelling, recent travel, urinary symptoms, or any other symptoms or complaints.      Social history: The patient has a " "remote history of smoking     REVIEW OF SYSTEMS   All systems reviewed, please see HPI for pertinent findings    PAST MEDICAL HISTORY:  Past Medical History:   Diagnosis Date    Hypertension        PAST SURGICAL HISTORY:  Past Surgical History:   Procedure Laterality Date     SECTION      TONSILLECTOMY         CURRENT MEDICATIONS:    No current facility-administered medications for this encounter.    Current Outpatient Medications:     doxycycline hyclate (VIBRAMYCIN) 100 MG capsule, Take 1 capsule (100 mg) by mouth 2 times daily for 7 days, Disp: 14 capsule, Rfl: 0    predniSONE (DELTASONE) 20 MG tablet, Take two tablets (= 40mg) each day for 5 (five) days, Disp: 10 tablet, Rfl: 0    acetaZOLAMIDE (DIAMOX) 250 MG tablet, Take 3 tablets (750 mg) by mouth daily 2 tabs QAM and 1 tab QPM for 1 month, then 1 tab BID for month, then 1 tab QD for month and stop, Disp: 90 tablet, Rfl: 3    amLODIPine (NORVASC) 5 MG tablet, TK 1 T PO D, Disp: , Rfl: 3    ARIPiprazole (ABILIFY) 10 MG tablet, TK 1 T PO QD, Disp: , Rfl: 1    ARIPiprazole (ABILIFY) 5 MG tablet, Take 5 mg by mouth daily, Disp: , Rfl:     azithromycin (ZITHROMAX) 250 MG tablet, , Disp: , Rfl: 0    VENTOLIN  (90 Base) MCG/ACT inhaler, INHALE 2 PUFFS PO Q 4 H PRN, Disp: , Rfl: 0    ALLERGIES:  Allergies   Allergen Reactions    Varenicline Other (See Comments)     Severe mood instability, became physically aggressive    Cephalexin Other (See Comments)    Lisinopril      Other reaction(s): Angioedema       FAMILY HISTORY:  Family History   Problem Relation Age of Onset    EYE* Mother         vision loss/HTN       SOCIAL HISTORY:   Social History     Socioeconomic History    Marital status:    Tobacco Use    Smoking status: Every Day    Smokeless tobacco: Never       PHYSICAL EXAM:    Vitals: BP (!) 173/89   Pulse 106   Temp 98.3  F (36.8  C) (Oral)   Resp 18   Ht 1.854 m (6' 1\")   Wt 149.7 kg (330 lb)   SpO2 93%   BMI 43.54 kg/m   "   Constitutional: Well developed, well nourished. No acute distress.  HENT: Normocephalic, atraumatic. Neck-gross ROM intact.   Eyes: Pupils mid-range, sclera white  Respiratory: Very faint rhonchi at bases of lungs, no respiratory distress, speaks full sentences easily.  Cardiovascular: Normal heart rate, regular rhythm. No lower extremity edema   GI: Soft, not distended, not tender to palpation, no guarding  Musculoskeletal: Moving all 4 extremities intentionally and without pain. No obvious deformity.  Skin: Warm, dry, no rash.  Neurologic: Alert & oriented, speech clear, Cns grossly intact, no focal deficits seen    LAB:  All pertinent labs reviewed and interpreted.  Labs Ordered and Resulted from Time of ED Arrival to Time of ED Departure   COMPREHENSIVE METABOLIC PANEL - Abnormal       Result Value    Sodium 140      Potassium 3.6      Carbon Dioxide (CO2) 24      Anion Gap 13      Urea Nitrogen 12.5      Creatinine 0.89      GFR Estimate 84      Calcium 8.9      Chloride 103      Glucose 159 (*)     Alkaline Phosphatase 108      AST 14      ALT 27      Protein Total 7.1      Albumin 3.7      Bilirubin Total 0.2     CBC WITH PLATELETS AND DIFFERENTIAL - Abnormal    WBC Count 11.7 (*)     RBC Count 4.83      Hemoglobin 12.3      Hematocrit 38.9      MCV 81      MCH 25.5 (*)     MCHC 31.6      RDW 15.2 (*)     Platelet Count 256      % Neutrophils 72      % Lymphocytes 19      % Monocytes 7      % Eosinophils 2      % Basophils 0      % Immature Granulocytes 0      NRBCs per 100 WBC 0      Absolute Neutrophils 8.4 (*)     Absolute Lymphocytes 2.2      Absolute Monocytes 0.8      Absolute Eosinophils 0.2      Absolute Basophils 0.0      Absolute Immature Granulocytes 0.0      Absolute NRBCs 0.0     TROPONIN T, HIGH SENSITIVITY - Normal    Troponin T, High Sensitivity <6     NT PROBNP INPATIENT - Normal    N terminal Pro BNP Inpatient 37     D DIMER QUANTITATIVE - Normal    D-Dimer Quantitative 0.29     HCG  QUALITATIVE PREGNANCY - Normal    hCG Serum Qualitative Negative     INFLUENZA A/B, RSV, & SARS-COV2 PCR - Normal    Influenza A PCR Negative      Influenza B PCR Negative      RSV PCR Negative      SARS CoV2 PCR Negative     TSH WITH FREE T4 REFLEX - Normal    TSH 3.26         RADIOLOGY:  Chest XR,  PA & LAT   Final Result   IMPRESSION: Negative chest.          EKG:   Performed at: 12/21/23 at 11:20 PM  Impression: Abnormal EKG. Possible left atrial enlargement. Nonspecific T wave abnormality   Rate: 94  Rhythm: Normal sinus rhythm   QRS Interval: 100  QTc Interval: 457  Comparison: No previous EKGs available     I independently reviewed and interpreted the EKG(s) documented above.     PROCEDURES:       I, Sydney Cooper, am serving as a scribe to document services personally performed by Dr. Kareen Pruitt based on my observation and the provider's statements to me. I, Kareen Pruitt MD attest that Sydney Cooper is acting in a scribe capacity, has observed my performance of the services and has documented them in accordance with my direction.    Kareen Pruitt M.D.  Emergency Medicine  [facility] EMERGENCY DEPARTMENT  [address] [place] [address]  [phone]  Dept: [phone]     Kareen Pruitt MD  12/22/23 3319

## 2023-12-22 NOTE — ED TRIAGE NOTES
"Pt has been \"coughing for months\". Pt is recovering from covid x 2 weeks. Pt has had flu shot. Pt has SOB and chest discomfort with cough.     Triage Assessment (Adult)       Row Name 12/21/23 7660          Triage Assessment    Airway WDL WDL        Respiratory WDL    Respiratory WDL X;cough     Cough Frequency frequent     Cough Type congested        Skin Circulation/Temperature WDL    Skin Circulation/Temperature WDL WDL        Cardiac WDL    Cardiac WDL WDL        Peripheral/Neurovascular WDL    Peripheral Neurovascular WDL WDL        Cognitive/Neuro/Behavioral WDL    Cognitive/Neuro/Behavioral WDL WDL                     "

## 2023-12-22 NOTE — DISCHARGE INSTRUCTIONS
Please take antibiotics and steroids as prescribed  Can use albuterol inhaler you have as needed  Return to the Emergency Room with significant increased work of breathing, new/worsening chest pain, abdominal pain, or other worsening symptoms or concerns

## 2024-01-28 ENCOUNTER — HEALTH MAINTENANCE LETTER (OUTPATIENT)
Age: 39
End: 2024-01-28

## 2024-04-07 ENCOUNTER — HEALTH MAINTENANCE LETTER (OUTPATIENT)
Age: 39
End: 2024-04-07

## 2025-02-01 ENCOUNTER — HEALTH MAINTENANCE LETTER (OUTPATIENT)
Age: 40
End: 2025-02-01